# Patient Record
Sex: FEMALE | URBAN - METROPOLITAN AREA
[De-identification: names, ages, dates, MRNs, and addresses within clinical notes are randomized per-mention and may not be internally consistent; named-entity substitution may affect disease eponyms.]

---

## 2022-02-28 ENCOUNTER — APPOINTMENT (RX ONLY)
Dept: URBAN - METROPOLITAN AREA CLINIC 77 | Facility: CLINIC | Age: 27
Setting detail: DERMATOLOGY
End: 2022-02-28

## 2022-02-28 DIAGNOSIS — L40.0 PSORIASIS VULGARIS: ICD-10-CM

## 2022-02-28 DIAGNOSIS — L85.3 XEROSIS CUTIS: ICD-10-CM

## 2022-02-28 DIAGNOSIS — L81.0 POSTINFLAMMATORY HYPERPIGMENTATION: ICD-10-CM

## 2022-02-28 DIAGNOSIS — B36.0 PITYRIASIS VERSICOLOR: ICD-10-CM

## 2022-02-28 DIAGNOSIS — L81.4 OTHER MELANIN HYPERPIGMENTATION: ICD-10-CM

## 2022-02-28 PROCEDURE — 99203 OFFICE O/P NEW LOW 30 MIN: CPT

## 2022-02-28 PROCEDURE — ? COUNSELING

## 2022-02-28 PROCEDURE — ? TREATMENT REGIMEN

## 2022-02-28 PROCEDURE — ? PRESCRIPTION

## 2022-02-28 PROCEDURE — ? COUNSELING: TOPICAL STEROIDS

## 2022-02-28 RX ORDER — KETOCONAZOLE 20 MG/ML
SHAMPOO, SUSPENSION TOPICAL BIW
Qty: 120 | Refills: 3 | Status: ERX | COMMUNITY
Start: 2022-02-28

## 2022-02-28 RX ORDER — CLOBETASOL PROPIONATE 0.5 MG/ML
SOLUTION TOPICAL
Qty: 1 | Refills: 6 | Status: ERX | COMMUNITY
Start: 2022-02-28

## 2022-02-28 RX ORDER — TRETIONIN 0.5 MG/G
CREAM TOPICAL
Qty: 45 | Refills: 3 | Status: ERX | COMMUNITY
Start: 2022-02-28

## 2022-02-28 RX ORDER — SULCONAZOLE NITRATE 10 MG/G
CREAM TOPICAL
Qty: 60 | Refills: 2 | Status: ERX | COMMUNITY
Start: 2022-02-28

## 2022-02-28 RX ADMIN — CLOBETASOL PROPIONATE: 0.5 SOLUTION TOPICAL at 00:00

## 2022-02-28 RX ADMIN — KETOCONAZOLE: 20 SHAMPOO, SUSPENSION TOPICAL at 00:00

## 2022-02-28 RX ADMIN — SULCONAZOLE NITRATE: 10 CREAM TOPICAL at 00:00

## 2022-02-28 RX ADMIN — TRETIONIN: 0.5 CREAM TOPICAL at 00:00

## 2022-02-28 ASSESSMENT — LOCATION SIMPLE DESCRIPTION DERM
LOCATION SIMPLE: LEFT PRETIBIAL REGION
LOCATION SIMPLE: RIGHT PRETIBIAL REGION
LOCATION SIMPLE: POSTERIOR NECK

## 2022-02-28 ASSESSMENT — LOCATION DETAILED DESCRIPTION DERM
LOCATION DETAILED: LEFT MEDIAL TRAPEZIAL NECK
LOCATION DETAILED: LEFT INFERIOR POSTERIOR NECK
LOCATION DETAILED: RIGHT PROXIMAL PRETIBIAL REGION
LOCATION DETAILED: LEFT DISTAL PRETIBIAL REGION
LOCATION DETAILED: MID POSTERIOR NECK

## 2022-02-28 ASSESSMENT — LOCATION ZONE DERM
LOCATION ZONE: NECK
LOCATION ZONE: LEG

## 2022-02-28 ASSESSMENT — BSA PSORIASIS: % BODY COVERED IN PSORIASIS: 4

## 2022-02-28 NOTE — PROCEDURE: TREATMENT REGIMEN
Detail Level: Zone
Plan: Location: face\\nPrescribed: Tretinoin 0.05%\\nPharmacy: [facility] \\n\\nPt has hyperpigmentation on face today.\\nDiscussed with pt that this pigmentation is due to sun damage to the skin, advised patient to use SPF daily.\\nDiscussed with pt that we will start patient on Tretinoin 0.05% to use nightly.\\nDiscussed with pt to apply a pea size amount to face, pushing into pores. \\nDiscussed with pt to avoid areas around eyes, nasal crease, and around the lips since skin is thin and may get irritated easily.\\n\\nDiscussed with pt that it will come in a box that says keep refrigerated.\\nDiscussed with pt that they do not need to keep it refrigerated, but have to keep it in a dark place since sunlight may oxidize it.\\nDiscussed with pt that if skin becomes irritated while using cream, then pt can change frequency to every other night, or every other other night, etc.\\nDiscussed with pt that results are not immediate and may take up to a month to notice change.\\nAdvised pt to apply a moisturizing cream such as Cerave afterwards to help reestablish a healthy skin barrier.\\nF/u as needed.
Plan: Location: Chest, back\\nPrescribe: Ketoconazole 200mg po QD x 3 days\\nExelderm topical \\n\\nDiscussed with pt that white scaly hyperpigmented patches are from an overcolonization of yeast that naturally lives on our body. \\nWill start pt on Exelderm ; she is to start the cream nightly. \\nWE DISCUSSED THAT THIS IS A CHRONIC ISSUE---WILL TEND TO HAPPEN IN THE FUTURE WHEN PT IS STRESSED OUT AND HAS A LOT SWEAT.\\nTo prevent this we will give pt Ketaconazole shampoo to use weekly as a prophylactic to prevent recurrence. \\nF/u as needed.
Action 4: Continue
Other Instructions: Location:Neck and scalp\\nPrescribe: Clobetasol Scalp Solution 0.05%\\nPharmacy: [facility]\\n\\nPatient presents dry/flaky skin on neck and scalp that has come and go for many years.\\nPt has recently moved from [place] and has been using Clobetasol Scalp Solution 0.05%\\nShe states she has used the Clobetasol once a week a couple of hours before her showering.\\nShe comes today to establish care and get refills on the Clobetasol.\\n\\nDiscussed with pt:\\nEducated patient about psoriasis and explained in great detail that this is caused by his immune system attacking the skin/becoming angry, during times of stress or illness.\\nDiscussed with patient that there will be times that he feels as if his skin is completely clear, and that there will be times where his psoriasis is very active.\\n\\nWill prescribe Clobetasol Scalp Solution 0.05% to help with inflammation\\nEducated patient about how to use medications that we will use to treat this condition, and educated patient on the important of maintaining a healthy skin barrier by using Cerave Moisturizing Cream/Healing Ointment daily.\\nAdvised pt to only lather soap under the arms, chest, and in private areas.\\nF/u as needed.

## 2023-03-22 ENCOUNTER — APPOINTMENT (RX ONLY)
Dept: URBAN - METROPOLITAN AREA CLINIC 77 | Facility: CLINIC | Age: 28
Setting detail: DERMATOLOGY
End: 2023-03-22

## 2023-03-22 DIAGNOSIS — D22 MELANOCYTIC NEVI: ICD-10-CM

## 2023-03-22 DIAGNOSIS — L40.0 PSORIASIS VULGARIS: ICD-10-CM | Status: WORSENING

## 2023-03-22 DIAGNOSIS — L81.4 OTHER MELANIN HYPERPIGMENTATION: ICD-10-CM

## 2023-03-22 PROBLEM — D22.4 MELANOCYTIC NEVI OF SCALP AND NECK: Status: ACTIVE | Noted: 2023-03-22

## 2023-03-22 PROCEDURE — ? TREATMENT REGIMEN

## 2023-03-22 PROCEDURE — ? COUNSELING: TOPICAL STEROIDS

## 2023-03-22 PROCEDURE — ? PRESCRIPTION

## 2023-03-22 PROCEDURE — ? COUNSELING

## 2023-03-22 PROCEDURE — 99213 OFFICE O/P EST LOW 20 MIN: CPT

## 2023-03-22 RX ORDER — TAPINAROF 10 MG/1000MG
CREAM TOPICAL
Qty: 60 | Refills: 3 | Status: ERX | COMMUNITY
Start: 2023-03-22

## 2023-03-22 RX ORDER — CLOBETASOL PROPIONATE 0.5 MG/ML
SOLUTION TOPICAL
Qty: 1 | Refills: 10 | Status: ERX

## 2023-03-22 RX ORDER — TRETIONIN 0.5 MG/G
CREAM TOPICAL
Qty: 45 | Refills: 10 | Status: ERX

## 2023-03-22 RX ORDER — ROFLUMILAST 3 MG/G
CREAM TOPICAL
Qty: 60 | Refills: 3 | Status: ERX | COMMUNITY
Start: 2023-03-22

## 2023-03-22 RX ADMIN — ROFLUMILAST: 3 CREAM TOPICAL at 00:00

## 2023-03-22 RX ADMIN — TAPINAROF: 10 CREAM TOPICAL at 00:00

## 2023-03-22 ASSESSMENT — LOCATION DETAILED DESCRIPTION DERM: LOCATION DETAILED: MID TRAPEZIAL NECK

## 2023-03-22 ASSESSMENT — LOCATION SIMPLE DESCRIPTION DERM: LOCATION SIMPLE: TRAPEZIAL NECK

## 2023-03-22 ASSESSMENT — LOCATION ZONE DERM: LOCATION ZONE: NECK

## 2023-03-22 ASSESSMENT — BSA PSORIASIS: % BODY COVERED IN PSORIASIS: 12

## 2023-03-22 NOTE — PROCEDURE: TREATMENT REGIMEN
Action 4: Continue
Detail Level: Zone
Other Instructions: Location:Neck and scalp\\nPrescribe: Clobetasol Scalp Solution 0.05%, Vtama 1 % topical cream and Zoryve 0.3 % topical cream\\nPharmacy: [facility]\\nOriginal BSA:12%\\n\\n\\n\\n\\n3/22/23\\nPt is here for a follow up after a year \\nPt states she has been using the topical steroid Clobetasol 0.05% scalp solution trying to manage her psoriasis under control but continues to flare up \\nPt states her psoriasis has been getting worse and is here for further treatment \\n\\nDiscussed with the Patient she continues to presents dry/flaky skin on neck and scalp and mentioned the  Clobetasol 0.05% scalp solution is the strongest steroid topically \\nInformed the pt we can continue with a treatment regimen and I can prescribe 2 new topicals or discuss about Biologic; Skyrizi\\nEducated the pt about Skyrizi, an auto injector that helps treat severe plaque psoriasis \\nDiscussed with pt we will continue with a treatment regimen \\nI will prescribe Vtama 1 % topical cream(Patient is to apply to the affected areas daily or as needed)\\nI will prescribe Zoryve 0.3 % topical cream(Patient is to apply to the affected areas daily or as needed)\\nInstructed the pt \\n\\nEducated patient about psoriasis and explained in great detail that this is caused by his immune system attacking the skin/becoming angry, during times of stress or illness.\\nDiscussed with patient that there will be times that he feels as if his skin is completely clear, and that there will be times where his psoriasis is very active.\\n\\nWill refill Clobetasol Scalp Solution 0.05% to help with inflammation\\nEducated patient about how to use medications that we will use to treat this condition, and educated patient on the important of maintaining a healthy skin barrier by using Cerave Moisturizing Cream/Healing Ointment daily.\\nAdvised pt to only lather soap under the arms, chest, and in private areas.\\n\\nF/u 2-3months
Plan: Location: face\\nPrescribed: Tretinoin 0.05%\\nPharmacy: [facility] \\n\\nPt has hyperpigmentation on face today.\\nDiscussed with pt that this pigmentation is due to sun damage to the skin, advised patient to use SPF daily.\\nDiscussed with pt that we will start patient on Tretinoin 0.05% to use nightly.\\nDiscussed with pt to apply a pea size amount to face, pushing into pores. \\nDiscussed with pt to avoid areas around eyes, nasal crease, and around the lips since skin is thin and may get irritated easily.\\n\\nDiscussed with pt that it will come in a box that says keep refrigerated.\\nDiscussed with pt that they do not need to keep it refrigerated, but have to keep it in a dark place since sunlight may oxidize it.\\nDiscussed with pt that if skin becomes irritated while using cream, then pt can change frequency to every other night, or every other other night, etc.\\nDiscussed with pt that results are not immediate and may take up to a month to notice change.\\nAdvised pt to apply a moisturizing cream such as Cerave afterwards to help reestablish a healthy skin barrier.\\nF/u as needed.

## 2023-03-22 NOTE — PROCEDURE: MIPS QUALITY
Quality 111:Pneumonia Vaccination Status For Older Adults: Documentation of medical reason(s) for not administering pneumococcal vaccine (e.g., adverse reaction to vaccine)
Quality 226: Preventive Care And Screening: Tobacco Use: Screening And Cessation Intervention: Patient screened for tobacco use and is an ex/non-smoker
Quality 130: Documentation Of Current Medications In The Medical Record: Current Medications Documented
Quality 110: Preventive Care And Screening: Influenza Immunization: Influenza Immunization Administered during Influenza season
Detail Level: Detailed